# Patient Record
Sex: FEMALE | Race: AMERICAN INDIAN OR ALASKA NATIVE | URBAN - METROPOLITAN AREA
[De-identification: names, ages, dates, MRNs, and addresses within clinical notes are randomized per-mention and may not be internally consistent; named-entity substitution may affect disease eponyms.]

---

## 2023-03-29 ENCOUNTER — OFFICE VISIT (OUTPATIENT)
Facility: LOCATION | Age: 83
End: 2023-03-29
Payer: MEDICARE

## 2023-03-29 DIAGNOSIS — H35.341 MACULAR CYST, HOLE, OR PSEUDOHOLE, RIGHT EYE: ICD-10-CM

## 2023-03-29 DIAGNOSIS — Z96.1 PRESENCE OF INTRAOCULAR LENS: ICD-10-CM

## 2023-03-29 DIAGNOSIS — H35.3134 NEXDTVE AGE-REL MCLR DEGN, BI, ADV ATRPC WITH SUBFOVEAL INVL: Primary | ICD-10-CM

## 2023-03-29 PROCEDURE — 99204 OFFICE O/P NEW MOD 45 MIN: CPT | Performed by: OPHTHALMOLOGY

## 2023-03-29 PROCEDURE — 92134 CPTRZ OPH DX IMG PST SGM RTA: CPT | Performed by: OPHTHALMOLOGY

## 2023-03-29 ASSESSMENT — INTRAOCULAR PRESSURE
OD: 17
OS: 20

## 2023-03-29 NOTE — IMPRESSION/PLAN
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 The diagnosis, natural history, and prognosis of dry AMD with GA were discussed at length. The patient was instructed on the importance of taking AREDS2 vitamins and informed that smoking increases the risk of blindness for this disease. Also, discussed participation in clinical trial.  The patient was educated on the proper use of the Amsler grid and encouraged to use it daily to monitor the vision in each eye. The patient was instructed to call our office immediately upon any decreased vision or increased distortion. Discussed syfovre vs observation.   Patient elects Syfovre OS

RTC 2 mo with OCT OU, poss Syfovre OS

## 2023-03-29 NOTE — IMPRESSION/PLAN
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 Plan: Do not recommend PPV due to underlying atrophy OD.